# Patient Record
Sex: FEMALE | Race: WHITE | Employment: FULL TIME | URBAN - METROPOLITAN AREA
[De-identification: names, ages, dates, MRNs, and addresses within clinical notes are randomized per-mention and may not be internally consistent; named-entity substitution may affect disease eponyms.]

---

## 2017-12-15 PROBLEM — E73.9 LACTOSE INTOLERANCE: Status: ACTIVE | Noted: 2017-12-15

## 2017-12-15 PROBLEM — N93.8 DUB (DYSFUNCTIONAL UTERINE BLEEDING): Status: ACTIVE | Noted: 2017-12-15

## 2017-12-15 PROBLEM — J45.30 MILD PERSISTENT ASTHMA, UNSPECIFIED WHETHER COMPLICATED: Status: ACTIVE | Noted: 2017-12-15

## 2017-12-15 PROBLEM — Z87.59 HISTORY OF ECTOPIC PREGNANCY: Status: ACTIVE | Noted: 2017-12-15

## 2017-12-15 PROBLEM — E66.01 MORBID OBESITY WITH BMI OF 40.0-44.9, ADULT (HCC): Status: ACTIVE | Noted: 2017-12-15

## 2017-12-19 ENCOUNTER — OFFICE VISIT (OUTPATIENT)
Dept: FAMILY MEDICINE CLINIC | Facility: CLINIC | Age: 47
End: 2017-12-19

## 2017-12-19 DIAGNOSIS — Z11.1 SCREENING FOR TUBERCULOSIS: Primary | ICD-10-CM

## 2017-12-19 PROCEDURE — 86580 TB INTRADERMAL TEST: CPT | Performed by: PHYSICIAN ASSISTANT

## 2017-12-19 NOTE — PATIENT INSTRUCTIONS
Natalie Velázquez 41year old female       [place] [address]    · Live vaccines in the past month? no  · Any steroid medication in the past month? No oral steroids  · History of BCG vaccine? no  · If female, are you currently pregnant?

## 2017-12-19 NOTE — PROGRESS NOTES
Presents for PPD placement. 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 Feeling well today. Administered PPD without difficulty. Follow up 48-72 hrs for PPD read.

## 2017-12-21 ENCOUNTER — OFFICE VISIT (OUTPATIENT)
Dept: FAMILY MEDICINE CLINIC | Facility: CLINIC | Age: 47
End: 2017-12-21

## 2017-12-21 DIAGNOSIS — Z11.1 SCREENING FOR TUBERCULOSIS: Primary | ICD-10-CM

## 2017-12-21 NOTE — PROGRESS NOTES
Patient presents for PPD read. Test placed on 12/19/17    Results: 0 mm induration. Letter of results printed and given patient. No further questions or concerns at this time.

## 2018-02-02 PROBLEM — D25.9 UTERINE LEIOMYOMA, UNSPECIFIED LOCATION: Status: ACTIVE | Noted: 2018-02-02

## 2018-02-19 PROCEDURE — 88175 CYTOPATH C/V AUTO FLUID REDO: CPT | Performed by: OBSTETRICS & GYNECOLOGY

## 2018-02-19 PROCEDURE — 87624 HPV HI-RISK TYP POOLED RSLT: CPT | Performed by: OBSTETRICS & GYNECOLOGY

## 2018-03-05 PROCEDURE — 88305 TISSUE EXAM BY PATHOLOGIST: CPT | Performed by: OBSTETRICS & GYNECOLOGY

## 2018-03-27 PROBLEM — J38.5 LARYNGOSPASM: Status: ACTIVE | Noted: 2018-03-27

## 2018-05-23 PROBLEM — M25.561 ACUTE PAIN OF RIGHT KNEE: Status: ACTIVE | Noted: 2018-05-23

## 2018-06-15 PROBLEM — J38.5 LARYNGOSPASM: Status: RESOLVED | Noted: 2018-03-27 | Resolved: 2018-06-15

## 2018-06-15 PROBLEM — M25.561 ACUTE PAIN OF RIGHT KNEE: Status: RESOLVED | Noted: 2018-05-23 | Resolved: 2018-06-15

## 2018-06-15 PROBLEM — M17.0 BILATERAL PRIMARY OSTEOARTHRITIS OF KNEE: Status: ACTIVE | Noted: 2018-06-15

## 2019-03-05 PROBLEM — A63.0 GENITAL WARTS: Status: ACTIVE | Noted: 2019-03-05

## 2019-03-05 PROBLEM — Z80.0 FAMILY HISTORY OF COLON CANCER: Status: ACTIVE | Noted: 2019-03-05

## 2019-04-15 PROCEDURE — 88305 TISSUE EXAM BY PATHOLOGIST: CPT | Performed by: OBSTETRICS & GYNECOLOGY

## 2020-03-03 PROBLEM — M25.511 RIGHT SHOULDER PAIN, UNSPECIFIED CHRONICITY: Status: ACTIVE | Noted: 2020-03-03

## 2020-03-03 PROBLEM — N93.8 DUB (DYSFUNCTIONAL UTERINE BLEEDING): Status: RESOLVED | Noted: 2017-12-15 | Resolved: 2020-03-03

## 2020-03-03 PROBLEM — Z78.0 POSTMENOPAUSAL: Status: ACTIVE | Noted: 2020-03-03

## 2020-06-19 PROBLEM — F32.A ANXIETY AND DEPRESSION: Status: ACTIVE | Noted: 2020-06-19

## 2020-06-19 PROBLEM — E66.9 OBESITY (BMI 30-39.9): Status: ACTIVE | Noted: 2020-06-19

## 2020-06-19 PROBLEM — F41.9 ANXIETY AND DEPRESSION: Status: ACTIVE | Noted: 2020-06-19

## 2020-06-19 PROBLEM — E66.01 MORBID OBESITY WITH BMI OF 40.0-44.9, ADULT (HCC): Status: RESOLVED | Noted: 2017-12-15 | Resolved: 2020-06-19

## 2020-08-07 PROBLEM — Z78.0 POSTMENOPAUSAL STATE: Status: ACTIVE | Noted: 2020-03-03

## 2021-02-05 PROBLEM — Z79.890 PREMATURE MENOPAUSE ON HRT: Status: ACTIVE | Noted: 2021-02-05

## 2021-02-05 PROBLEM — E28.319 PREMATURE MENOPAUSE ON HRT: Status: ACTIVE | Noted: 2021-02-05

## 2021-11-02 PROBLEM — A63.0 GENITAL WARTS: Status: RESOLVED | Noted: 2019-03-05 | Resolved: 2021-11-02

## 2023-10-19 ENCOUNTER — IMMUNIZATION (OUTPATIENT)
Dept: LAB | Age: 53
End: 2023-10-19
Attending: EMERGENCY MEDICINE
Payer: COMMERCIAL

## 2023-10-19 DIAGNOSIS — Z23 NEED FOR VACCINATION: Primary | ICD-10-CM

## 2023-10-19 PROCEDURE — 90686 IIV4 VACC NO PRSV 0.5 ML IM: CPT

## 2023-10-19 PROCEDURE — 90471 IMMUNIZATION ADMIN: CPT

## (undated) NOTE — LETTER
December 21, 2017    [place] [id number]      Dear Stepan Granger: The following are the results of your recent tests. Please review the list of test results.   Your result is the value on the left; we have also suppli